# Patient Record
Sex: FEMALE | Race: BLACK OR AFRICAN AMERICAN | NOT HISPANIC OR LATINO | URBAN - METROPOLITAN AREA
[De-identification: names, ages, dates, MRNs, and addresses within clinical notes are randomized per-mention and may not be internally consistent; named-entity substitution may affect disease eponyms.]

---
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
## 2025-07-24 NOTE — ED BEHAVIORAL HEALTH ASSESSMENT NOTE - SUMMARY
Hep B immunization given. Patient tolerated without incident. See immunization grid for documentation.       In summary, patient is a 19 y/o female, domiciled with family, currently enrolled student 11th grade, regular education. Patient has no previous psychiatric hx, no hx of suicide attempt or self-injurious behaviors, no hx of aggression, denies legal or medical hx, denies substance use, denies hx of abuse. Patient presents to [facility] urgent care brought in by mother referred by psychologist due to reported hx of self-injurious behaviors and suicidal ideation. Patient reports intermittent passive suicidal ideation over the past 5 months with worsening depressive sxs over the past 2 months including, sad mood, decreased interest, poor sleep, decreased appetite, irritability, and decreased motivation and concentration. Patient reports sxs of anxiety, worrying that is difficult to control. She denies current SI/plan/intent. She denies AH/VH/HI. She engaged in safety planning. She is future-oriented, hopeful, and agreeable to therapy. She does not meet criteria for inpatient hospitalization; would benefit from counseling and further evaluation.    Patient had 1st session with psychologist today. Mother to follow up with psychologist to schedule next session. Obtained consent to speak with psychologist; left message, awaiting call back.